# Patient Record
(demographics unavailable — no encounter records)

---

## 2025-07-22 NOTE — HISTORY OF PRESENT ILLNESS
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

## 2025-07-22 NOTE — PHYSICAL EXAM
[Right Carotid Bruit] : right carotid bruit heard [Left Carotid Bruit] : left carotid bruit heard [1+] : left 1+ [Alert] : alert [Oriented to Person] : oriented to person [Oriented to Place] : oriented to place [Oriented to Time] : oriented to time [Calm] : calm [de-identified] : nad [de-identified] : wnl [de-identified] : no resp distress [FreeTextEntry1] : Mild arterial insufficiency w mild trophic skin changes  no wounds/ulcers  [de-identified] : wnl [de-identified] : Rolando Cranial nerves 2-12 rolando grossly intact [de-identified] : cooperative

## 2025-07-22 NOTE — ASSESSMENT
[Arterial/Venous Disease] : arterial/venous disease [Medication Management] : medication management [Foot care/Footwear] : foot care/footwear [FreeTextEntry1] : Impression le art insuff  and carotid stenosis stable   Med conserv management protective measures, exercise program  continue pletal 50 bid ov w carotid duplex  s/o stenosis  2 years july 2027 ov w tish/pvr s/o art insuff  12mo july 2026

## 2025-07-22 NOTE — DATA REVIEWED
[FreeTextEntry1] : 12/30/2014 FLOYD/PVR no sig art occ dz sig for vessel calcification rt floyd 1.27 lt floyd 1.20  2/20/2015 Carotid Duplex Rolando ICA less 50% [place] [facility] flow  12/30/2015 floyd/pvr  Mild infragenic art insuff w vessel calcification rt floyd 1.22 lt floyd 1.23  2/17/2016 Carotid Duplex Rolando ICA less 50% stenosis [facility] flow   4/14/2017 FLOYD/PVR  no sig art occ dz sig for vessel calcification rt floyd 1.26 lt floyd 1.26   4/25/2018 carotid duplex   rolando ica less 50% stensis [facility] [facility] flow  4/25/2018  FLOYD/PVR  rle mild  art  w vessel calcificat and lle no sig art insuff w vessel calcific rt floyd  1.25 lt floyd  1.13  4/24/2019  FLOYD/PVR  rle mild  art  w vessel calcificat and lle no sig art insuff w vessel calcific rt floyd  1.21 lt floyd  1.23   4/5/2022   Carotid Duplex   pt wants to re [facility]   4/5/2022  FLOYD/PVR  pt wants to re [facility]   5/24/2022 Carotid Duplex   Rolando ICA  less 50% stenosis [place] [facility] flow  5/24/2022   FLOYD/PVR  rle mild  art  w vessel calcificat and lle no sig art insuff w vessel calcific                                   rt floyd  1.04 lt floyd  1.24   Outside facility report reviewed 2/1/2023  Arterial Doppler                                           RLE mild art  occ dz                                           LLE moderate  art  occ dz   7/11/2023  FLOYD/PVR  rle mild  art  w vessel calcificat and                                    lle no sig art insuff w vessel calcific                                    rt floyd  1.46 lt floyd  1.25  7/16/2024    FLOYD/PVR  RLE mild  art  insuff w vessel calcificat and                                      LLE mild art insuff w vessel calcific                                       Rt  floyd  1.46 Lt floyd  1.25  7/16/2024   Carotid Duplex   Rolando ICA  less 50% stenosis                                          Rol[facility] ant [facility] flow  7/22/2025  FLOYD/PVR  RLE mild  art  insuff w vessel calcificat and                                      LLE mild art insuff w vessel calcific                                       Rt  floyd  1.41 Lt floyd  1.38  7/22/2025   Carotid Duplex   Rolando ICA  less 50% stenosis                                          [facility] flow

## 2025-07-22 NOTE — DISCUSSION/SUMMARY
[Home] : at home, [unfilled] , at the time of the visit. [Medical Office: ([facility])___] : at the medical office located in  [Other:____] : [unfilled] [Verbal consent obtained from patient] : the patient, [unfilled] [Arterial/Venous Disease] : arterial/venous disease [Medication Management] : medication management